# Patient Record
Sex: MALE
[De-identification: names, ages, dates, MRNs, and addresses within clinical notes are randomized per-mention and may not be internally consistent; named-entity substitution may affect disease eponyms.]

---

## 2018-11-05 PROBLEM — Z00.00 ENCOUNTER FOR PREVENTIVE HEALTH EXAMINATION: Status: ACTIVE | Noted: 2018-11-05

## 2018-11-07 ENCOUNTER — APPOINTMENT (OUTPATIENT)
Dept: HEART AND VASCULAR | Facility: CLINIC | Age: 83
End: 2018-11-07

## 2018-11-14 ENCOUNTER — APPOINTMENT (OUTPATIENT)
Dept: HEART AND VASCULAR | Facility: CLINIC | Age: 83
End: 2018-11-14
Payer: MEDICARE

## 2018-11-14 VITALS
BODY MASS INDEX: 24.07 KG/M2 | DIASTOLIC BLOOD PRESSURE: 80 MMHG | WEIGHT: 141 LBS | HEART RATE: 55 BPM | HEIGHT: 64 IN | SYSTOLIC BLOOD PRESSURE: 125 MMHG

## 2018-11-14 PROCEDURE — 99214 OFFICE O/P EST MOD 30 MIN: CPT

## 2018-11-15 NOTE — DISCUSSION/SUMMARY
[FreeTextEntry1] : 86 yo M with PMH HTN, bradycardia , vertigo here for evaluation\par The patient reports feeling at his baseline\par \par BRADYCARDIA\par -the patient is currently asymptomatic and as per documentation he has been in sinus bradycardia for many years\par -as per report the patient had vertigo prior to diagnosis of bradycardia and he currently denies any syncope or presnycope\par -will closely follow symptoms and consider referral to EP\par \par HTN\par well controlled on no medications\par \par f/u 6 weeks \par \par

## 2018-11-15 NOTE — PHYSICAL EXAM
[General Appearance - Well Developed] : well developed [Heart Sounds] : normal S1 and S2 [Murmurs] : no murmurs present [Respiration, Rhythm And Depth] : normal respiratory rhythm and effort [Auscultation Breath Sounds / Voice Sounds] : lungs were clear to auscultation bilaterally [Abdomen Soft] : soft [Abdomen Tenderness] : non-tender [FreeTextEntry1] : no edema b/l

## 2018-11-15 NOTE — HISTORY OF PRESENT ILLNESS
[FreeTextEntry1] : 86 yo M with PMH HTN, bradycardia , vertigo here for evaluation\par The patient reports feeling at his baseline. He is able to walk for about 10 blocks without any symptoms. \par He denies any h/o sycnope or lightheadness. He has a long h/o sinus bradycardia. As per notes, he had a holter monitor on 4/2016 which revealed marked sinus bradycardia and episodes of junctional bradycardia and was monitored without need for a PM. No recent documentation is available \par \par He denies angina or palpitations, reports episodes of reproducible chest pain,. left and right sided self resolving and unchanged for the past 2 years \par \par \par \par PMH\par as above\par \par ALL\par NKDA\par \par HOME MEDS\par meclizine 10 mg daily \par \par SH \par former smoker, no etoh, no drugs\par \par

## 2018-11-15 NOTE — ASSESSMENT
[FreeTextEntry1] : 86 yo M with PMH HTN, bradycardia , vertigo here for evaluation. The patient is at baseline with good exercise tolerance without symptoms.

## 2018-12-26 ENCOUNTER — APPOINTMENT (OUTPATIENT)
Dept: HEART AND VASCULAR | Facility: CLINIC | Age: 83
End: 2018-12-26